# Patient Record
Sex: FEMALE | Race: WHITE | NOT HISPANIC OR LATINO | Employment: UNEMPLOYED
[De-identification: names, ages, dates, MRNs, and addresses within clinical notes are randomized per-mention and may not be internally consistent; named-entity substitution may affect disease eponyms.]

---

## 2022-02-03 ENCOUNTER — OFFICE VISIT (OUTPATIENT)
Dept: INTERNAL MEDICINE | Facility: OTHER | Age: 47
End: 2022-02-03
Payer: COMMERCIAL

## 2022-02-03 VITALS
WEIGHT: 264 LBS | DIASTOLIC BLOOD PRESSURE: 95 MMHG | SYSTOLIC BLOOD PRESSURE: 149 MMHG | OXYGEN SATURATION: 96 % | HEIGHT: 70 IN | BODY MASS INDEX: 37.8 KG/M2 | HEART RATE: 94 BPM | TEMPERATURE: 98.4 F

## 2022-02-03 DIAGNOSIS — N93.9 ABNORMAL VAGINAL BLEEDING: ICD-10-CM

## 2022-02-03 DIAGNOSIS — E66.9 OBESITY (BMI 30-39.9): ICD-10-CM

## 2022-02-03 DIAGNOSIS — F17.200 TOBACCO DEPENDENCE: ICD-10-CM

## 2022-02-03 DIAGNOSIS — N92.6 ABNORMAL BLEEDING IN MENSTRUAL CYCLE: ICD-10-CM

## 2022-02-03 DIAGNOSIS — Z00.00 WELLNESS EXAMINATION: ICD-10-CM

## 2022-02-03 PROCEDURE — 99214 OFFICE O/P EST MOD 30 MIN: CPT | Mod: GC

## 2022-02-03 ASSESSMENT — PATIENT HEALTH QUESTIONNAIRE - PHQ9
SUM OF ALL RESPONSES TO PHQ QUESTIONS 1-9: 9
5. POOR APPETITE OR OVEREATING: 3 - NEARLY EVERY DAY
CLINICAL INTERPRETATION OF PHQ2 SCORE: 3

## 2022-02-03 NOTE — PATIENT INSTRUCTIONS
1. Referral to OBGYN for abnormal vaginal bleeding  2. Labs as soon as you can  3. Consider starting Chantix          Varenicline oral tablets  What is this medicine?  VARENICLINE (nevin e NI kleen) is used to help people quit smoking. It is used with a patient support program recommended by your physician.  This medicine may be used for other purposes; ask your health care provider or pharmacist if you have questions.  COMMON BRAND NAME(S): Chantix  What should I tell my health care provider before I take this medicine?  They need to know if you have any of these conditions:  · heart disease  · if you often drink alcohol  · kidney disease  · mental illness  · on hemodialysis  · seizures  · history of stroke  · suicidal thoughts, plans, or attempt; a previous suicide attempt by you or a family member  · an unusual or allergic reaction to varenicline, other medicines, foods, dyes, or preservatives  · pregnant or trying to get pregnant  · breast-feeding  How should I use this medicine?  Take this medicine by mouth after eating. Take with a full glass of water. Follow the directions on the prescription label. Take your doses at regular intervals. Do not take your medicine more often than directed.  There are 3 ways you can use this medicine to help you quit smoking; talk to your health care professional to decide which plan is right for you:  1) you can choose a quit date and start this medicine 1 week before the quit date, or,  2) you can start taking this medicine before you choose a quit date, and then pick a quit date between day 8 and 35 days of treatment, or,  3) if you are not sure that you are able or willing to quit smoking right away, start taking this medicine and slowly decrease the amount you smoke as directed by your health care professional with the goal of being cigarette-free by week 12 of treatment.  Stick to your plan; ask about support groups or other ways to help you remain cigarette-free. If you  are motivated to quit smoking and did not succeed during a previous attempt with this medicine for reasons other than side effects, or if you returned to smoking after this treatment, speak with your health care professional about whether another course of this medicine may be right for you.  A special MedGuide will be given to you by the pharmacist with each prescription and refill. Be sure to read this information carefully each time.  Talk to your pediatrician regarding the use of this medicine in children. This medicine is not approved for use in children.  Overdosage: If you think you have taken too much of this medicine contact a poison control center or emergency room at once.  NOTE: This medicine is only for you. Do not share this medicine with others.  What if I miss a dose?  If you miss a dose, take it as soon as you can. If it is almost time for your next dose, take only that dose. Do not take double or extra doses.  What may interact with this medicine?  · alcohol  · insulin  · other medicines used to help people quit smoking  · theophylline  · warfarin  This list may not describe all possible interactions. Give your health care provider a list of all the medicines, herbs, non-prescription drugs, or dietary supplements you use. Also tell them if you smoke, drink alcohol, or use illegal drugs. Some items may interact with your medicine.  What should I watch for while using this medicine?  It is okay if you do not succeed at your attempt to quit and have a cigarette. You can still continue your quit attempt and keep using this medicine as directed. Just throw away your cigarettes and get back to your quit plan.  Talk to your health care provider before using other treatments to quit smoking. Using this medicine with other treatments to quit smoking may increase the risk for side effects compared to using a treatment alone.  You may get drowsy or dizzy. Do not drive, use machinery, or do anything that needs  "mental alertness until you know how this medicine affects you. Do not stand or sit up quickly, especially if you are an older patient. This reduces the risk of dizzy or fainting spells.  Decrease the number of alcoholic beverages that you drink during treatment with this medicine until you know if this medicine affects your ability to tolerate alcohol. Some people have experienced increased drunkenness (intoxication), unusual or sometimes aggressive behavior, or no memory of things that have happened (amnesia) during treatment with this medicine.  Sleepwalking can happen during treatment with this medicine, and can sometimes lead to behavior that is harmful to you, other people, or property. Stop taking this medicine and tell your doctor if you start sleepwalking or have other unusual sleep-related activity.  After taking this medicine, you may get up out of bed and do an activity that you do not know you are doing. The next morning, you may have no memory of this. Activities include driving a car (\"sleep-driving\"), making and eating food, talking on the phone, sexual activity, and sleep-walking. Serious injuries have occurred. Stop the medicine and call your doctor right away if you find out you have done any of these activities. Do not take this medicine if you have used alcohol that evening. Do not take it if you have taken another medicine for sleep. The risk of doing these sleep-related activities is higher.  Patients and their families should watch out for new or worsening depression or thoughts of suicide. Also watch out for sudden changes in feelings such as feeling anxious, agitated, panicky, irritable, hostile, aggressive, impulsive, severely restless, overly excited and hyperactive, or not being able to sleep. If this happens, call your health care professional.  If you have diabetes and you quit smoking, the effects of insulin may be increased and you may need to reduce your insulin dose. Check with your " doctor or health care professional about how you should adjust your insulin dose.  What side effects may I notice from receiving this medicine?  Side effects that you should report to your doctor or health care professional as soon as possible:  · allergic reactions like skin rash, itching or hives, swelling of the face, lips, tongue, or throat  · acting aggressive, being angry or violent, or acting on dangerous impulses  · breathing problems  · changes in emotions or moods  · chest pain or chest tightness  · feeling faint or lightheaded, falls  · hallucination, loss of contact with reality  · mouth sores  · redness, blistering, peeling or loosening of the skin, including inside the mouth  · signs and symptoms of a stroke like changes in vision; confusion; trouble speaking or understanding; severe headaches; sudden numbness or weakness of the face, arm or leg; trouble walking; dizziness; loss of balance or coordination  · seizures  · sleepwalking  · suicidal thoughts or other mood changes  Side effects that usually do not require medical attention (report to your doctor or health care professional if they continue or are bothersome):  · constipation  · gas  · headache  · nausea, vomiting  · strange dreams  · trouble sleeping  This list may not describe all possible side effects. Call your doctor for medical advice about side effects. You may report side effects to FDA at 6-626-FOM-7086.  Where should I keep my medicine?  Keep out of the reach of children.  Store at room temperature between 15 and 30 degrees C (59 and 86 degrees F). Throw away any unused medicine after the expiration date.  NOTE: This sheet is a summary. It may not cover all possible information. If you have questions about this medicine, talk to your doctor, pharmacist, or health care provider.  © 2020 [employer] (2019-12-06 14:27:36)

## 2022-02-03 NOTE — PROGRESS NOTES
Date of Service:  2/3/2022    CC: Establish care, abnormal menstrual bleeding    HPI:  Tracie Cordero  is a 47 y.o. female with a PMH of  Obesity presents to the clinic to establish care and to discuss abnormal menstrual bleeding and weight gain.  She has been having increased length of periods and heavier bleeding. She has been going through one tampon and pad per hour. Additionally, she has times where she has large amounts of gushing flow. Her period may last for weeks or even a month. Initially she was not worried when they started a couple years ago. She initially thought this was realted to menopause.   She has not been having any cramping related to her symptoms. These periods have caused her to feel down and she has had decreased energy and weight gain. She is afraid of increased movement that will lead to a gush of flow. The only other associated symptom is feeling bloated. She has not had any lightheadedness or dizziness, but sometimes has had it with her period. She has not had any headaches or shortness of breath.     Patient's first period was when she was in 6th grade (around 12-13 years old). She had regular periods. She has had 3 pregnancies, on with twins. She has had not other gynecological history in the past. She has never had a PAP smear. She has never been screened for STIs. She has had the same partner for the past 20 years and is not concerned.     Patient would like to quit smoking. She has tried patch and nicorette in the past. Would be willing to try Chantix. She has tried to use the [place] quit line in the past as well.     Patient currently is sad and upset because she found out her father passed away today. She has not had contact with her father in 15 years. Discussed with patient the importance of taking time and space for herself. Discussed talkspace and it being free for [facility] residents. In addition to this acute emotional situation, patient has had a difficult year. Her son was  diagnosed with diabetes this year.         Review of systems:  See HPI     Past Medical History:  Patient Active Problem List    Diagnosis Date Noted   • Abnormal vaginal bleeding 02/04/2022   • Tobacco dependence 02/04/2022   • Obesity (BMI 30-39.9) 02/03/2022       Past Surgical History:    has no past surgical history on file.    Medications:  Current Outpatient Medications   Medication Sig Dispense Refill   • varenicline (CHANTIX STARTING MONTH PAK) 0.5 MG X 11 & 1 MG X 42 tablet Take 0.5 mg once day for three days, 0.5 mg twice a day for 4 days. 1 mg twice a day for 12 weeks. Stop smoking one week into starting medications. 56 Each 3     No current facility-administered medications for this visit.       Allergies:  No Known Allergies    Family History:   family history includes Cancer in her maternal grandmother.     Social History:    Social History     Tobacco Use   • Smoking status: Current Every Day Smoker     Packs/day: 1.00   • Smokeless tobacco: Never Used   Substance Use Topics   • Alcohol use: No   • Drug use: No       Activity Level: minimal  Living situation:  Lives with partner of 20 years  Recent travel:  none  Other (stressors, spirituality, exposures):  none    Physical Exam:  Vitals:    02/03/22 1405   BP: 149/95   Pulse: 94   Temp: 36.9 °C (98.4 °F)   SpO2: 96%     Body mass index is 37.88 kg/m².  Physical Exam  Constitutional:       General: She is not in acute distress.     Appearance: Normal appearance. She is obese. She is not ill-appearing, toxic-appearing or diaphoretic.   HENT:      Head: Normocephalic and atraumatic.      Mouth/Throat:      Mouth: Mucous membranes are moist.      Pharynx: Oropharynx is clear. No oropharyngeal exudate or posterior oropharyngeal erythema.   Eyes:      Extraocular Movements: Extraocular movements intact.      Pupils: Pupils are equal, round, and reactive to light.   Cardiovascular:      Rate and Rhythm: Normal rate and regular rhythm.      Pulses: Normal  pulses.      Heart sounds: No murmur heard.  No friction rub.   Pulmonary:      Effort: Pulmonary effort is normal. No respiratory distress.      Breath sounds: No wheezing or rales.   Abdominal:      General: Bowel sounds are normal. There is no distension.      Palpations: Abdomen is soft.      Tenderness: There is no abdominal tenderness.   Musculoskeletal:         General: No swelling. Normal range of motion.      Cervical back: Normal range of motion. No rigidity.      Right lower leg: No edema.      Left lower leg: No edema.   Skin:     General: Skin is warm and dry.   Neurological:      Mental Status: She is alert and oriented to person, place, and time.   Psychiatric:         Mood and Affect: Mood is depressed (Normal given circumstances).         Speech: Speech normal.         Behavior: Behavior normal.         Thought Content: Thought content normal.              Assessment/Plan:  Abnormal vaginal bleeding  Patient has had increased vaginal bleeding and length of period. It has negatively impacted patient's life, causing distress and decreased exercise, leading to weight gain. Patient does not have any pain assoicated with the bleeding. It is possible that patient has endometrial hyperplasia, vs. leomyoma vs. Endometrial carcinoma. Further evaluation is warranted.     Plan:  1. Referral to OBGYN  2. CBC to see if anemia from blood loss is present  3. Pap smear at OBGYN or at a later date with this office    Obesity (BMI 30-39.9)  Discussed current barriers to patient exercise. She feels that if she had decreased periods and had more energy, she would be increasingly active. Concern for possible metabolic syndrome and elevated cholesterol. Concern for possible renal function as well.     Plan:  1. Follow up with OBGYN about abnormal vaginal bleeding  2. Increase exercise and be mindful about diet portions   3. Hemoglobin A1c  4. Lipid panel   5. CMP    Tobacco dependence  Patient has been smoking one pack  per day. She would like to try quit smoking. Discussed past attempts to quit. She is willing to try Chantix.     Plan:  1. Prescribed Chantix: 0.5 mg for 3 days, 0.5 mg BID for 4 days, 1 g BID for 12 weeks. Start for one week prior to stopping smoking.            All imaging results and lab results and consult notes are reviewed at this visit.  Follow up: Return in about 2 months (around 4/3/2022).    Nayeli Whittington MD  Internal Medicine PGY-1

## 2022-02-04 PROBLEM — N93.9 ABNORMAL VAGINAL BLEEDING: Status: ACTIVE | Noted: 2022-02-04

## 2022-02-04 PROBLEM — F17.200 TOBACCO DEPENDENCE: Status: ACTIVE | Noted: 2022-02-04

## 2022-02-05 NOTE — ASSESSMENT & PLAN NOTE
Patient has had increased vaginal bleeding and length of period. It has negatively impacted patient's life, causing distress and decreased exercise, leading to weight gain. Patient does not have any pain assoicated with the bleeding. It is possible that patient has endometrial hyperplasia, vs. leomyoma vs. Endometrial carcinoma. Further evaluation is warranted.     Plan:  1. Referral to OBGYN  2. CBC to see if anemia from blood loss is present  3. Pap smear at OBGYN or at a later date with this office

## 2022-02-05 NOTE — ASSESSMENT & PLAN NOTE
Patient has been smoking one pack per day. She would like to try quit smoking. Discussed past attempts to quit. She is willing to try Chantix.     Plan:  1. Prescribed Chantix: 0.5 mg for 3 days, 0.5 mg BID for 4 days, 1 g BID for 12 weeks. Start for one week prior to stopping smoking.

## 2022-02-05 NOTE — ASSESSMENT & PLAN NOTE
Discussed current barriers to patient exercise. She feels that if she had decreased periods and had more energy, she would be increasingly active. Concern for possible metabolic syndrome and elevated cholesterol. Concern for possible renal function as well.     Plan:  1. Follow up with OBGYN about abnormal vaginal bleeding  2. Increase exercise and be mindful about diet portions   3. Hemoglobin A1c  4. Lipid panel   5. CMP

## 2022-05-06 ENCOUNTER — OFFICE VISIT (OUTPATIENT)
Dept: INTERNAL MEDICINE | Facility: OTHER | Age: 47
End: 2022-05-06
Payer: COMMERCIAL

## 2022-05-06 VITALS
WEIGHT: 261.2 LBS | SYSTOLIC BLOOD PRESSURE: 159 MMHG | OXYGEN SATURATION: 94 % | HEART RATE: 96 BPM | TEMPERATURE: 98.3 F | HEIGHT: 70 IN | DIASTOLIC BLOOD PRESSURE: 92 MMHG | BODY MASS INDEX: 37.39 KG/M2

## 2022-05-06 DIAGNOSIS — F17.200 TOBACCO DEPENDENCE: ICD-10-CM

## 2022-05-06 DIAGNOSIS — N93.9 ABNORMAL VAGINAL BLEEDING: ICD-10-CM

## 2022-05-06 PROCEDURE — 99214 OFFICE O/P EST MOD 30 MIN: CPT | Mod: GC

## 2022-05-06 RX ORDER — VARENICLINE TARTRATE 1 MG/1
TABLET, FILM COATED ORAL
COMMUNITY
Start: 2022-02-11 | End: 2022-05-06

## 2022-05-06 RX ORDER — NICOTINE 21 MG/24HR
1 PATCH, TRANSDERMAL 24 HOURS TRANSDERMAL EVERY 24 HOURS
Qty: 30 PATCH | Refills: 6 | Status: SHIPPED | OUTPATIENT
Start: 2022-05-06

## 2022-05-06 NOTE — PROGRESS NOTES
Date of Service:  5/6/2022    CC: Follow up for abnormal uterine bleeding    HPI:  Tracie Cordero  is a 47 y.o. female with a PMH of obesity, tobacco dependence and abnormal uterine bleeding presents to the clinic for a follow up. Last visit, we discussed her abnormal uterine bleeding along with increased fatigue. Labs were placed and an OBGYN referral was also placed. That visit was the same day her father passed away and she was unsure of how to get labs done or follow up with OBGYN.     She has continued to have increased uterine bleeding. She has continued to have increased bleeding, periods lasting around 1.5 weeks-one month. She has been using around 1 tampon per hour and has increased the amount of tampons and pads that she has to purchase. Her last period was a week and a half, but has been an upwards of two months. She sometimes has to change her tampon every thirty minutes. She does have some lower abdominal pain during the start of her period. But no cramping during her period. Patient has had four normal pregnancies, no spontaneous abortions. She has never used hormonal contraception. She denies any lightheadedness or dizziness, but has continued to have fatigue. She has also had shortness of breath that she attributes to her smoking. She feels as if her mood has overall been ok. No family history of ovarian, or endometrial cancer. Her maternal grandmother had breast cancer at 40 years old.     She took the entire course of chantix, she felt nauseated and sick most of the time taking it. It did decrease the amount of cigarettes she used, but she has not quit. Discussed how difficult smoking is and different options for smoking cessation.       Review of systems:    Constitutional: Negative for chills and fever.   HENT: Negative for hearing loss.    Eyes: Negative.    Respiratory: Positive for shortness of breath.    Cardiovascular: Negative for chest pain and palpitations.   Gastrointestinal:  Negative for abdominal pain, constipation and diarrhea.   Genitourinary: Negative for dysuria, frequency, hematuria and urgency.   Musculoskeletal: Negative for joint pain and myalgias.   Neurological: Negative for headaches.     Past Medical History:  Patient Active Problem List    Diagnosis Date Noted   • Abnormal vaginal bleeding 02/04/2022   • Tobacco dependence 02/04/2022   • Obesity (BMI 30-39.9) 02/03/2022       Past Surgical History:    has no past surgical history on file.    Medications:  Current Outpatient Medications   Medication Sig Dispense Refill   • nicotine (NICODERM) 21 MG/24HR PATCH 24 HR Place 1 Patch on the skin every 24 hours. 30 Patch 6     No current facility-administered medications for this visit.       Allergies:  No Known Allergies    Family History:   family history includes Cancer in her maternal grandmother.     Social History:    Social History     Tobacco Use   • Smoking status: Current Every Day Smoker     Packs/day: 1.00   • Smokeless tobacco: Never Used   Substance Use Topics   • Alcohol use: No   • Drug use: No       Activity Level: Minimal  Living situation:  Lives with children and   Recent travel:  none  Other (stressors, spirituality, exposures):  none    Physical Exam:  Vitals:    05/06/22 1509   BP: 159/92   Pulse: 96   Temp: 36.8 °C (98.3 °F)   SpO2: 94%     Body mass index is 37.48 kg/m².  Physical Exam  Constitutional:       Appearance: Normal appearance. She is obese.   HENT:      Head: Normocephalic and atraumatic.      Nose: No congestion or rhinorrhea.      Mouth/Throat:      Mouth: Mucous membranes are dry.      Pharynx: Oropharynx is clear. No oropharyngeal exudate or posterior oropharyngeal erythema.   Eyes:      Extraocular Movements: Extraocular movements intact.      Conjunctiva/sclera: Conjunctivae normal.      Pupils: Pupils are equal, round, and reactive to light.      Comments: No obvious palness   Cardiovascular:      Rate and Rhythm: Normal rate  and regular rhythm.      Pulses: Normal pulses.      Heart sounds: Normal heart sounds. No murmur heard.    No friction rub. No gallop.   Pulmonary:      Effort: Pulmonary effort is normal. No respiratory distress.      Breath sounds: Normal breath sounds. No wheezing or rales.   Abdominal:      General: Abdomen is flat. Bowel sounds are normal. There is no distension.      Palpations: Abdomen is soft. There is no mass.      Tenderness: There is no abdominal tenderness. There is no guarding.   Genitourinary:     General: Normal vulva.      Rectum: Normal.   Musculoskeletal:         General: No swelling or deformity. Normal range of motion.      Right lower leg: No edema.      Left lower leg: No edema.   Skin:     General: Skin is warm and dry.      Findings: No lesion.   Neurological:      General: No focal deficit present.      Mental Status: She is alert and oriented to person, place, and time.   Psychiatric:         Mood and Affect: Mood normal.          Labs:  none    Imaging:  none    Assessment/Plan:  Tobacco dependence  Discussed smoking cessation with patient, she is determined to try to quit.     Plan:  -Since she failed chantix, have ordered nicoderm patches    Abnormal vaginal bleeding  She has continued abnormal uterine bleeding. Differentia includes endometriosis, endometrial hyperplasia, abnormal growth or hormonal dysregulation. Also concern with increased bleeding with symptoms of fatigue, possible anemia. Discussed the importance of following with OBGYN    Plan:  -CMP  -CBC  -HA1c  -TSH, with Free T4  -Follow through with OBGYN referral               All imaging results and lab results and consult notes are reviewed at this visit.  Followup: Return in about 2 months (around 7/6/2022).    Nayeli Whittington MD  Internal Medicine PGY-1

## 2022-05-07 NOTE — ASSESSMENT & PLAN NOTE
She has continued abnormal uterine bleeding. Differentia includes endometriosis, endometrial hyperplasia, abnormal growth or hormonal dysregulation. Also concern with increased bleeding with symptoms of fatigue, possible anemia. Discussed the importance of following with OBGYN    Plan:  -CMP  -CBC  -HA1c  -TSH, with Free T4  -Follow through with OBGYN referral

## 2022-05-07 NOTE — ASSESSMENT & PLAN NOTE
Discussed smoking cessation with patient, she is determined to try to quit.     Plan:  -Since she failed chantix, have ordered nicoderm patches

## 2025-08-20 DIAGNOSIS — Z00.6 CLINICAL TRIAL PARTICIPANT: ICD-10-CM

## 2025-11-10 ENCOUNTER — APPOINTMENT (OUTPATIENT)
Dept: MEDICAL GROUP | Facility: MEDICAL CENTER | Age: 50
End: 2025-11-10
Payer: COMMERCIAL